# Patient Record
(demographics unavailable — no encounter records)

---

## 2025-02-13 NOTE — PHYSICAL EXAM
[Alert] : alert [No Acute Distress] : no acute distress [No Respiratory Distress] : no respiratory distress [No Acc Muscle Use] : no accessory muscle use [Heart Rate And Rhythm] : heart rate was normal and rhythm regular [Abdomen Tenderness] : non-tender [No Masses] : no abdominal mass palpated [Abdomen Soft] : soft [] : no rash [Oriented To Time, Place, And Person] : oriented to person, place, and time [Normal Insight/Judgment] : insight and judgment were intact

## 2025-02-14 NOTE — REVIEW OF SYSTEMS
[As Noted in HPI] : as noted in HPI [Negative] : Respiratory [Fever] : no fever [Chills] : no chills [Recent Weight Loss (___ Lbs)] : no recent weight loss [Joint Swelling] : no joint swelling [Joint Stiffness] : no joint stiffness

## 2025-02-14 NOTE — END OF VISIT
[] : Fellow [FreeTextEntry3] : As modified and discussed with patient MD JUAN Linares [facility] Associate Professor of Medicine [facility]

## 2025-02-14 NOTE — END OF VISIT
[] : Fellow [FreeTextEntry3] : As modified and discussed with patient MD JUAN Linares [facility] Associate Professor of Medicine [facility]

## 2025-02-14 NOTE — HISTORY OF PRESENT ILLNESS
[FreeTextEntry1] : 85F with PMH hypothyroidism, DM, HTN, HLD, GERD and recent admission for hematochezia (24 - 25) now on prednisone 40 mg daily and mesalamine, presented for post-discharge follow up.   Per [facility] discharge note: "presenting with 3 week of abdominal pain and blood in stool.  Patient reports she first noticed small amount of blood in her stool 2-3 weeks ago but noted acute worsening of symptoms over the past week. She also reports she had been taking fidaxomicin daily for 10 days, which she finished the day of admission. She was unaware of a C. diff diagnosis being made, she never dropped off a stool sample she believes in the outpatient setting- so was potentially treated empirically? Patient is mildly tachycardic. CRP 46, CTA A/P showed pancolitis but no obvious bleeding source.   Patient with continued Hematochezia w/diarrhea/ Patient inutally with over 10 watery BM with blood. GI consulted, patient is s/p flex sigmoidoscopy showing Erythematous, friable (with contact bleeding), inflamed and vascular-pattern-decreased mucosa in the rectum, in the recto-sigmoid colon and in the sigmoid colon. Sue Grade 2 colitis, UCEIS score 3,  mild-moderate colitis. Bx results results with evidence of chronic active colitis. Pt has been treated w/IV 20mg TID x3 days and then transitioned to prednisone 40mg on / Prednisone to be tapered down by 10mg qweekly. GI recommend initial tx w/ Mesalamine XR 1 g TID and 1 g AR w/ steroid taper weekly by 10 mg. Patient will need f/u by GI within 1-2 weeks. Given initiation of steroids, patient was started on 4mg Lantus qhs with sliding scale insulin, Patient received insulin teaching at bedside. Patient will be set up with home care at home."  Patient is accompanied by daughter. She denied current hematochezia, melena or abdominal pain. She has 1 BM daily that was still loose (not watery). She is finishing the 40 mg daily prednisone today and was supposed to start 30 mg daily tomorrow. Also taking PO mesalamine as prescribed (just picked up mesalamine suppository and not started it yet).   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   Patient otherwise denied fever, chills, chest pain, shortness of breath, unintentional weight loss, dysphagia, odynophagia, nausea, vomiting, decreased appetite, early satiety, joint pain, visual changes/pain or rash.   Family hx: daughter and niece have ulcerative colitis. Father  of some form of gut cancer. Patient' aunt had a portion of gut removed (unclear diagnosis); daughter has GERD and some GI issues (no diagnosis)  No ETOH, smoking.  Not on A/C Was on ASA (for primary ppx) but stopped due to bleeding.

## 2025-02-14 NOTE — HISTORY OF PRESENT ILLNESS
[FreeTextEntry1] : 85F with PMH hypothyroidism, DM, HTN, HLD, GERD and recent admission for hematochezia (24 - 25) now on prednisone 40 mg daily and mesalamine, presented for post-discharge follow up.   Per [facility] discharge note: "presenting with 3 week of abdominal pain and blood in stool.  Patient reports she first noticed small amount of blood in her stool 2-3 weeks ago but noted acute worsening of symptoms over the past week. She also reports she had been taking fidaxomicin daily for 10 days, which she finished the day of admission. She was unaware of a C. diff diagnosis being made, she never dropped off a stool sample she believes in the outpatient setting- so was potentially treated empirically? Patient is mildly tachycardic. CRP 46, CTA A/P showed pancolitis but no obvious bleeding source.   Patient with continued Hematochezia w/diarrhea/ Patient inutally with over 10 watery BM with blood. GI consulted, patient is s/p flex sigmoidoscopy showing Erythematous, friable (with contact bleeding), inflamed and vascular-pattern-decreased mucosa in the rectum, in the recto-sigmoid colon and in the sigmoid colon. Sue Grade 2 colitis, UCEIS score 3,  mild-moderate colitis. Bx results results with evidence of chronic active colitis. Pt has been treated w/IV 20mg TID x3 days and then transitioned to prednisone 40mg on / Prednisone to be tapered down by 10mg qweekly. GI recommend initial tx w/ Mesalamine XR 1 g TID and 1 g NH w/ steroid taper weekly by 10 mg. Patient will need f/u by GI within 1-2 weeks. Given initiation of steroids, patient was started on 4mg Lantus qhs with sliding scale insulin, Patient received insulin teaching at bedside. Patient will be set up with home care at home."  Patient is accompanied by daughter. She denied current hematochezia, melena or abdominal pain. She has 1 BM daily that was still loose (not watery). She is finishing the 40 mg daily prednisone today and was supposed to start 30 mg daily tomorrow. Also taking PO mesalamine as prescribed (just picked up mesalamine suppository and not started it yet).   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   Patient otherwise denied fever, chills, chest pain, shortness of breath, unintentional weight loss, dysphagia, odynophagia, nausea, vomiting, decreased appetite, early satiety, joint pain, visual changes/pain or rash.   Family hx: daughter and niece have ulcerative colitis. Father  of some form of gut cancer. Patient' aunt had a portion of gut removed (unclear diagnosis); daughter has GERD and some GI issues (no diagnosis)  No ETOH, smoking.  Not on A/C Was on ASA (for primary ppx) but stopped due to bleeding.

## 2025-02-14 NOTE — ASSESSMENT
[FreeTextEntry1] : 85F with PMH hypothyroidism, DM, HTN, HLD, GERD and recent admission for hematochezia (1/31/24 - 2/7/25) now on prednisone and mesalamine, presented for post-discharge.  Hospital records reviewed.   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 - Last ophthalm exam 3 months ago  Plan: - Mesalamine 4.8 G daily (Could do 2.4G BID or 4.8 G daily per patient's tolerance) - Could still use Mesalamine suppository if patient has rectal urgency/spasm - Will check CBC and iron panel given recent hematochezia - Will slow taper prednisone (start 35 mg daily tomorrow for 1 week then taper by 5 mg weekly, additional script sent to [facility]) - Repeat fecal calprotectin 1 week prior to next visit - Return to clinic between 3/7 - 3/13 to adjust steroid taper & re-evaluate symptoms - Will plan for colonoscopy when patient is better controlled  - Ophthalm exam per scheduled (advised patient to mention about new UC diagnosis) - Daughter to set up vascular sx evaluation for patient's celiac artery stenosis  D/w Dr. Arielle Steel, PGY-6

## 2025-02-14 NOTE — REASON FOR VISIT
[Initial Evaluation] : an initial evaluation [Post Hospitalization] : a post hospitalization visit [Family Member] : family member [FreeTextEntry1] : colitis

## 2025-02-14 NOTE — ASSESSMENT
[FreeTextEntry1] : 85F with PMH hypothyroidism, DM, HTN, HLD, GERD and recent admission for hematochezia (1/31/24 - 2/7/25) now on prednisone and mesalamine, presented for post-discharge.  Hospital records reviewed.   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 - Last ophthalm exam 3 months ago  Plan: - Mesalamine 4.8 G daily (Could do 2.4G BID or 4.8 G daily per patient's tolerance) - Could still use Mesalamine suppository if patient has rectal urgency/spasm - Will check CBC and iron panel given recent hematochezia - Will slow taper prednisone (start 35 mg daily tomorrow for 1 week then taper by 5 mg weekly, additional script sent to [facility]) - Repeat fecal calprotectin 1 week prior to next visit - Return to clinic between 3/7 - 3/13 to adjust steroid taper & re-evaluate symptoms - Will plan for colonoscopy when patient is better controlled  - Ophthalm exam per scheduled (advised patient to mention about new UC diagnosis) - Daughter to set up vascular sx evaluation for patient's celiac artery stenosis  D/w Dr. Arielle Steel, PGY-6

## 2025-03-13 NOTE — HISTORY OF PRESENT ILLNESS
[FreeTextEntry1] : 85F with PMH hypothyroidism, DM, HTN, HLD, GERD and recent admission for hematochezia (25 - 25) now on prednisone 40 mg daily and mesalamine, presented for follow up.   Per [facility] discharge note for hospitalization 25-25: "presenting with 3 week of abdominal pain and blood in stool. Patient reports she first noticed small amount of blood in her stool 2-3 weeks ago but noted acute worsening of symptoms over the past week. She also reports she had been taking fidaxomicin daily for 10 days, which she finished the day of admission. She was unaware of a C. diff diagnosis being made, she never dropped off a stool sample she believes in the outpatient setting- so was potentially treated empirically? Patient is mildly tachycardic. CRP 46, CTA A/P showed pancolitis but no obvious bleeding source.  Patient with continued Hematochezia w/diarrhea/ Patient initially with over 10 watery BM with blood. GI consulted, patient is s/p flex sigmoidoscopy showing Erythematous, friable (with contact bleeding), inflamed and vascular-pattern-decreased mucosa in the rectum, in the recto-sigmoid colon and in the sigmoid colon. Sue Grade 2 colitis, UCEIS score 3, mild-moderate colitis. Bx results results with evidence of chronic active colitis. Pt has been treated w/IV 20mg TID x3 days and then transitioned to prednisone 40mg on / Prednisone to be tapered down by 10mg qweekly. GI recommend initial tx w/ Mesalamine XR 1 g TID and 1 g IN w/ steroid taper weekly by 10 mg. Patient will need f/u by GI within 1-2 weeks."  At last visit in 2025, patient without current hematochezia, melena or abdominal pain. She has 1 BM daily without blood. Also taking PO mesalamine as prescribed. At this visit, patient denied fever, chills, chest pain, shortness of breath, unintentional weight loss, dysphagia, odynophagia, nausea, vomiting, decreased appetite, early satiety, joint pain, visual changes/pain or rash. She has some slight urgency with bowel movements but has not started the suppositories   Family hx: daughter and niece have ulcerative colitis. Father  of some form of gut cancer. Patient' aunt had a portion of gut removed (unclear diagnosis); daughter has GERD and some GI issues (no diagnosis)  No ETOH, smoking. Not on A/C Was on ASA (for primary ppx) but stopped due to bleeding.

## 2025-03-13 NOTE — END OF VISIT
[] : Fellow [FreeTextEntry3] : The patient was recently hospitalized with rectal bleeding. A flexible sigmoidoscopy with biopsy revealed ulcerative colitis. She is on treatment with mesalamine and a tapering schedule of prednisone. She is currently feeling well and has not seen bleeding. Continue the mesalamine and the tapering schedule of prednisone. To plan on a colonoscopy after the steroids are tapered and to return to clinic for further evaluation.

## 2025-03-13 NOTE — PHYSICAL EXAM
[Alert] : alert [Well Developed] : well developed [Well Nourished] : well nourished [Sclera] : the sclera and conjunctiva were normal [Hearing Threshold Finger Rub Not [place]] : hearing was normal [Normal Appearance] : the appearance of the neck was normal [No Respiratory Distress] : no respiratory distress [Abdomen Tenderness] : non-tender [No Masses] : no abdominal mass palpated [Abdomen Soft] : soft [No CVA Tenderness] : no CVA  tenderness [Abnormal Walk] : normal gait [Oriented To Time, Place, And Person] : oriented to person, place, and time [Normal Affect] : the affect was normal [Normal Mood] : the mood was normal

## 2025-03-13 NOTE — ASSESSMENT
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 - Last ophthalm exam 3 months ago - fecal calprotectin 3/11 85   Plan: - Mesalamine 4.8 G daily (Could do 2.4G BID or 4.8 G daily per patient's tolerance) - start Mesalamine suppository if patient has rectal urgency/spasm - Will slow taper prednisone (taper by 5 mg weekly, patient will start 20mg tomorrow, additional script sent to [facility]) - Will hold off on PJP ppx at this time given patient tapering steroid <20mg and <3m of steroids  - Return to clinic on week of 4/7 to adjust steroid taper & re-evaluate symptoms - Will plan for colonoscopy when patient is off steroid taper  - Ophthalm exam yearly (last visited 1 week prior)  D/w Dr. Ronnell Hampton MD, PGY6 GI Fellow

## 2025-04-03 NOTE — END OF VISIT
[] : Fellow [FreeTextEntry3] : Agree with above. Steroids being tapered off, only on 5mg daily of prednisone, in clinical remission with minimal prednisone and oral mesalamine. Will try to taper off steroids and maintain on mesalamine alone, images from flex sig is mild-moderate disease. Plan for repeat full colonoscopy once off steroids in 2 months to evaluate for mucosal healing on mesalamine, and to evaluate rest of the colon. [Time Spent: ___ minutes] : I have spent [unfilled] minutes of time on the encounter which excludes teaching and separately reported services.

## 2025-04-03 NOTE — PHYSICAL EXAM
[Alert] : alert [No Respiratory Distress] : no respiratory distress [Heart Rate And Rhythm] : heart rate was normal and rhythm regular [Abdomen Tenderness] : non-tender [Abdomen Soft] : soft [Normal Affect] : the affect was normal

## 2025-04-03 NOTE — HISTORY OF PRESENT ILLNESS
[FreeTextEntry1] : 85F with PMH hypothyroidism, DM, HTN, HLD, GERD and recent diagnosis of Ulcerative Colitis (dx 2025 on Mesalamine and steroid taper) currently presenting for a follow up visit.   Ulcerative Colitis Hx:  - Admitted from -2025 for 3 weeks of abd pain and blood in stool - underwent flex sig significant for Erythematous, friable (with contact bleeding), inflamed and vascular-pattern-decreased mucosa in the rectum, in the recto-sigmoid colon and in the sigmoid colon. Sue Grade 2 colitis, UCEIS score 3, mild-moderate colitis. Bx results results with evidence of chronic active colitis. Initially patient was treated w/ IV prednisone 20 mg TID and transition to prednisone 40 mg w/ slow taper by 10 mg Qweek and  - Last visit 3/13/2025 w/ improvement in symptoms on steroid taper and mesalamine. Recommended slow prednisone taper by 5 mg Qweek (20 mg QD 3/14- ) with plans for repeat colonoscopy once off steroid taper in the setting of findings of pancolitis on CT imaging and only prior flexible sigmoidoscopy.   This visit the patient reports that she has been feeling well, w/ regular soft brown bowel movements 1-2 times a day. Denies any hematochezia, nausea/vomiting, abdominal pain, fevers, chills. She has been tapering off prednisone and is currently on her last 2 days of prednisone 5 mg.   Family hx: daughter and niece have ulcerative colitis. Father  of some form of gut cancer. Patient' aunt had a portion of gut removed (unclear diagnosis); daughter has GERD and some GI issues (no diagnosis)  No ETOH, smoking. Not on A/C Was on ASA (for primary ppx) but stopped due to bleeding

## 2025-04-03 NOTE — ASSESSMENT
[FreeTextEntry1] : 5F with PMH hypothyroidism, DM, HTN, HLD, GERD and recent diagnosis of Ulcerative Colitis (dx 2/2025 on Mesalamine and steroid taper) currently presenting for a follow up visit.  #Ulcerative Colitis  Diagnosed on 2/2025 after 5-6 episodes of hemaotchezia for 3 weeks. Flex sig 2/2025 w/ mild inflammation from rectum to sigmoid UCEIS 3, Sue Grade II colitis. Currently doing well on Mesalamine 4.8g and low dose prednisone 5 mg that has been slowly tapered.  Reasonable to trial off steroids given relatively mild disease, however if sx recur may require biologics, would favor Entyvio.  - Last optho exam 2 months ago no uveitis - No new rashes  Plan: - Repeat colonoscopy in 2 months given CT w/ pancolitis, once off steroids to evaluate for mucosal healing on mesalamine alone - Stop prednisone today - Continue w/ mesalamine 4.8g QHS - Repeat Fecal calprotectin and CRP next visit - If w/ recurrence in symptoms off steroids, patient recommended to return to clinic sooner to restart prednisone and consider biologic therapy. Would favor Entyvio given low side effect profile.  Discussed w/ Dr. Hank Persaud